# Patient Record
Sex: FEMALE | HISPANIC OR LATINO | URBAN - METROPOLITAN AREA
[De-identification: names, ages, dates, MRNs, and addresses within clinical notes are randomized per-mention and may not be internally consistent; named-entity substitution may affect disease eponyms.]

---

## 2020-07-28 ENCOUNTER — OFFICE VISIT (OUTPATIENT)
Dept: URBAN - METROPOLITAN AREA TELEHEALTH 2 | Facility: TELEHEALTH | Age: 55
End: 2020-07-28

## 2020-12-04 ENCOUNTER — OFFICE VISIT (OUTPATIENT)
Dept: URBAN - METROPOLITAN AREA CLINIC 63 | Facility: CLINIC | Age: 55
End: 2020-12-04

## 2021-02-12 ENCOUNTER — OFFICE VISIT (OUTPATIENT)
Dept: URBAN - METROPOLITAN AREA CLINIC 63 | Facility: CLINIC | Age: 56
End: 2021-02-12

## 2021-02-22 ENCOUNTER — OFFICE VISIT (OUTPATIENT)
Dept: URBAN - METROPOLITAN AREA CLINIC 60 | Facility: CLINIC | Age: 56
End: 2021-02-22

## 2021-02-26 LAB — FECAL GLOBIN BY IMMUNOCHEMISTRY: (no result)

## 2021-03-09 ENCOUNTER — OFFICE VISIT (OUTPATIENT)
Dept: URBAN - METROPOLITAN AREA CLINIC 60 | Facility: CLINIC | Age: 56
End: 2021-03-09

## 2021-12-29 ENCOUNTER — OFFICE VISIT (OUTPATIENT)
Dept: URBAN - METROPOLITAN AREA CLINIC 60 | Facility: CLINIC | Age: 56
End: 2021-12-29

## 2022-07-09 ENCOUNTER — TELEPHONE ENCOUNTER (OUTPATIENT)
Dept: URBAN - METROPOLITAN AREA CLINIC 121 | Facility: CLINIC | Age: 57
End: 2022-07-09

## 2022-07-09 RX ORDER — TOPIRAMATE 25 MG/1
TABLET ORAL
Refills: 0 | OUTPATIENT
Start: 2016-11-05 | End: 2018-03-27

## 2022-07-09 RX ORDER — COLD-HOT PACK
EACH MISCELLANEOUS
Refills: 0 | OUTPATIENT
Start: 2019-05-21 | End: 2021-12-29

## 2022-07-09 RX ORDER — SUCRALFATE 1 G/1
TWICE A DAY TABLET ORAL TWICE A DAY
Refills: 3 | OUTPATIENT
Start: 2018-01-08 | End: 2018-03-27

## 2022-07-09 RX ORDER — CYCLOBENZAPRINE HYDROCHLORIDE 10 MG/1
TABLET, FILM COATED ORAL
Refills: 0 | OUTPATIENT
Start: 2020-12-01 | End: 2021-12-29

## 2022-07-09 RX ORDER — MULTIVIT-MIN/FOLIC/VIT K/LYCOP 400-300MCG
TABLET ORAL
Refills: 0 | OUTPATIENT
Start: 2020-12-04 | End: 2021-12-29

## 2022-07-09 RX ORDER — BACLOFEN 10 MG/1
TABLET ORAL
Refills: 0 | OUTPATIENT
Start: 2017-08-22 | End: 2018-03-27

## 2022-07-09 RX ORDER — COLD-HOT PACK
EACH MISCELLANEOUS
Refills: 0 | OUTPATIENT
Start: 2018-05-07 | End: 2018-12-18

## 2022-07-09 RX ORDER — ACETAMINOPHEN/DIPHENHYDRAMINE 500MG-25MG
TABLET ORAL
Refills: 0 | OUTPATIENT
Start: 2018-03-27 | End: 2018-05-07

## 2022-07-09 RX ORDER — ACETAMINOPHEN/DIPHENHYDRAMINE 500MG-25MG
TABLET ORAL
Refills: 0 | OUTPATIENT
Start: 2019-05-21 | End: 2021-12-29

## 2022-07-09 RX ORDER — PANTOPRAZOLE 20 MG/1
TWICE A DAY TABLET, DELAYED RELEASE ORAL TWICE A DAY
Refills: 3 | OUTPATIENT
Start: 2018-01-08 | End: 2018-03-27

## 2022-07-09 RX ORDER — AMOXICILLIN 500 MG/1
TABLET, FILM COATED ORAL TWICE A DAY
Refills: 0 | OUTPATIENT
Start: 2018-12-18 | End: 2019-03-26

## 2022-07-09 RX ORDER — ACETAMINOPHEN/DIPHENHYDRAMINE 500MG-25MG
TABLET ORAL
Refills: 0 | OUTPATIENT
Start: 2018-05-07 | End: 2018-12-18

## 2022-07-09 RX ORDER — ACETAMINOPHEN/DIPHENHYDRAMINE 500MG-25MG
TABLET ORAL
Refills: 0 | OUTPATIENT
Start: 2018-12-18 | End: 2019-05-21

## 2022-07-09 RX ORDER — COLD-HOT PACK
EACH MISCELLANEOUS
Refills: 0 | OUTPATIENT
Start: 2018-12-18 | End: 2019-05-21

## 2022-07-09 RX ORDER — IBUPROFEN 800 MG/1
TABLET, FILM COATED ORAL
Refills: 0 | OUTPATIENT
Start: 2020-12-01 | End: 2020-12-07

## 2022-07-09 RX ORDER — COLD-HOT PACK
EACH MISCELLANEOUS
Refills: 0 | OUTPATIENT
Start: 2018-03-27 | End: 2018-05-07

## 2022-07-10 ENCOUNTER — TELEPHONE ENCOUNTER (OUTPATIENT)
Dept: URBAN - METROPOLITAN AREA CLINIC 121 | Facility: CLINIC | Age: 57
End: 2022-07-10

## 2022-07-10 RX ORDER — METOPROLOL TARTRATE 25 MG/1
TABLET, FILM COATED ORAL
Refills: 0 | Status: ACTIVE | COMMUNITY
Start: 2021-12-29

## 2022-07-10 RX ORDER — MULTIVIT-MIN/FOLIC/VIT K/LYCOP 400-300MCG
TABLET ORAL ONCE A DAY
Refills: 0 | Status: ACTIVE | COMMUNITY
Start: 2021-12-29

## 2022-07-10 RX ORDER — CYCLOBENZAPRINE HYDROCHLORIDE 10 MG/1
TABLET, FILM COATED ORAL ONCE A DAY
Refills: 0 | Status: ACTIVE | COMMUNITY
Start: 2021-12-29

## 2022-07-10 RX ORDER — ACETAMINOPHEN/DIPHENHYDRAMINE 500MG-25MG
TABLET ORAL ONCE A DAY
Refills: 0 | Status: ACTIVE | COMMUNITY
Start: 2021-12-29

## 2022-07-10 RX ORDER — PANTOPRAZOLE SODIUM 40 MG/1
TABLET, DELAYED RELEASE ORAL ONCE A DAY
Refills: 0 | Status: ACTIVE | COMMUNITY
Start: 2021-12-29

## 2023-01-06 ENCOUNTER — OFFICE VISIT (OUTPATIENT)
Dept: URBAN - METROPOLITAN AREA CLINIC 60 | Facility: CLINIC | Age: 58
End: 2023-01-06
Payer: COMMERCIAL

## 2023-01-06 ENCOUNTER — WEB ENCOUNTER (OUTPATIENT)
Dept: URBAN - METROPOLITAN AREA CLINIC 60 | Facility: CLINIC | Age: 58
End: 2023-01-06

## 2023-01-06 VITALS
BODY MASS INDEX: 33.31 KG/M2 | DIASTOLIC BLOOD PRESSURE: 80 MMHG | WEIGHT: 188 LBS | HEIGHT: 63 IN | RESPIRATION RATE: 20 BRPM | SYSTOLIC BLOOD PRESSURE: 128 MMHG | OXYGEN SATURATION: 94 % | TEMPERATURE: 97.9 F | HEART RATE: 88 BPM

## 2023-01-06 DIAGNOSIS — D50.9 IRON DEFICIENCY ANEMIA, UNSPECIFIED IRON DEFICIENCY ANEMIA TYPE: ICD-10-CM

## 2023-01-06 PROBLEM — 87522002: Status: ACTIVE | Noted: 2023-01-06

## 2023-01-06 PROCEDURE — 99213 OFFICE O/P EST LOW 20 MIN: CPT | Performed by: NURSE PRACTITIONER

## 2023-01-06 RX ORDER — ACETAMINOPHEN/DIPHENHYDRAMINE 500MG-25MG
TABLET ORAL ONCE A DAY
Refills: 0 | Status: ACTIVE | COMMUNITY
Start: 2021-12-29

## 2023-01-06 RX ORDER — CYCLOBENZAPRINE HYDROCHLORIDE 10 MG/1
TABLET, FILM COATED ORAL ONCE A DAY
Refills: 0 | Status: ACTIVE | COMMUNITY
Start: 2021-12-29

## 2023-01-06 RX ORDER — MULTIVIT-MIN/FOLIC/VIT K/LYCOP 400-300MCG
TABLET ORAL ONCE A DAY
Refills: 0 | Status: ACTIVE | COMMUNITY
Start: 2021-12-29

## 2023-01-06 RX ORDER — LISINOPRIL 10 MG/1
1 TABLET TABLET ORAL ONCE A DAY
Status: ACTIVE | COMMUNITY

## 2023-01-06 NOTE — HPI-TODAY'S VISIT:
Patient comes for screening colonoscopy denies alarm symptoms denies family h/o colon cancer will plan colonoscopy.  11/17: Patient was seen in 2015 did not have the colonoscopy done then, now comes for screening. Patient had episode of anemia had EGD on October 2016 and had transfusion of blood, with no evidence of GI bleeding. Did not have a colonoscopy. Comes for evaluation will do colonoscopy. Will consider EGD. Will need records from the hospital. Patient denies melena or rectal bleeding. Patient comes for screening colonoscopy denies alarm symptoms denies family h/o colon cancer will plan colonoscopy. Discussion [Use for free text]. Discussed the need for appropriate follow-up care.  Patient will be placed in our recall system and a letter will be mailed to the patient. Discussed dietary restrictions pertaining to Gastritis Information sheet reviewed and a copy provided.   1/18 Patient with good general state,  gastritis symptoms are better. Colonoscopy with evidence of one tubular adenoma, diverticular disease, internal hemorrhoids and a small 10 mm leiomyoma. EGD with evidence of chronic esophageal and gastric mucosa inflammation and 4 cm Hiatal hernia. 3/18: Persist with anemia was sent for evaluation, will do occult blood in stool. If positive  will repeat EGD and will check occult blood in stool to consider capsule endoscopy / SBFT. 5/18: Patient had labs done with evidence of normal CBC, Ferritin, Iron panel, Vit B12 and folate is normal,  Will follow labs with  PCP (Dr Mcintyre) Will follow as needed basis.  12/18: Patient was seen by GYN had hysterectomy, and had drop of the Hb to 7, that improved and resolved the anemia to 13 with iron IV. it is unclear why patient has recurrent episode of anemia, will plan SBFT and capsule endoscopy to do a complete evaluation of her GI tract as possible cause, patient denies' melena, hematochezia or  hematemesis. Will follow closely, last Hb is 13, will check H and h and will treat as needed, will consider continuing with iron IV by hematology.  3/19: Patient had Capsule endoscopy with normal small bowel no evidence of active bleeding. Will do stool studies to r/o occult blood in stool, may consider colonoscopy if this is positive.  5/19: Patient had stool studies that are normal (occult blood in stool was not done) Patient doing well, denies rectal bleeding. Will follow in 3 months. 7/20: Patient with chronic anemia, in November 2019 her Hb drop to 4. Patient denies rectal bleeding, melena or hematemesis, with personal h/o colonic polyps, diverticulosis, hiatal hernia and gastritis, negative capsule endoscopy. Patient is due for endoscopic evaluation will plan EGD and colonoscopy. Last colonoscopy had a 12 mm polyp. Patient had last month kidney stone, the stone passed. Patient had EGD and colonoscopy at the hospital will need records to decide the surveillance time for her next endoscopic evaluation, for now will hold procedures until las procedures are reviewed and will plan follow-up in 5 months. She will call back if any GI symptoms or evidence of GI bleeding.   12/20 This is a patient that has a medical history of chronic anemia.  Patient had an extensive work-up for anemia in the pass and all her studies has been negative.  Patient last colonoscopy was done a year ago by Dr. Cabrera and was negative. Also, has done colonoscopy and enteroscopy last year  done by Dr Cabrera which were negative, small bowel follow-through,  capsule endoscopy last year, and all her study has been negative for any source of bleeding of the GI. Patient is here today asking to have an EGD because of her hematologist is asking her to have this procedure due to her anemia.  Patient denies any upper GI symptoms, including reflux or gastritis symptoms.  Denies any hematemesis, melena, hematochezia, or rectal bleeding. Plan: We are going to obtain medical records from her hematologist.  Will do follow blood occult on her stool, and we will proceed with EGD accordingly of the study result and  Request.  12/21 Patient here today in good general state.  Denies any GI symptoms.  Denies melena, rectal bleeding, hematochezia, hematemesis.  Occult blood in stool laboratory is negative. Plan: There is no plan for EGD or colonoscopy at this time since patient have no symptoms or sign of GI bleeding at this time. 1/23 Patient here today in good general state.  She was referred by her hematologist due to anemia.  We has been seeing her for anemia in the last 4 years (please see notes above) she has done an extensive work, no GI source of bleeding has been found.  Her last studies were. Patient last colonoscopy was done by Dr. Pepe 3 years ago the procedure shows evidence of severe pandiverticulosis with a small and large diverticula.  At the procedure time there were some impacted diverticula and peridiverticular erythema.  No active bleeding or blood seen.  Moderate sized internal hemorrhoids and external hemorrhoids. Enteroscopy done on November 2019 also by  shows evidence of a 7 cm hiatal hernia without Laurent erosions, normal esophagus, stomach, duodenum, and appearing proximal jejunum, no bleeding was found.  Today patient denies any symptoms of gastritis or reflux dyspepsia, upper GI bleeding or lower GI bleeding like hematemesis, melena, rectal bleeding.  We are going to do stool studies to rule out occult blood in stool.  We may consider colonoscopy, EGD, small bowel follow-through and capsule endoscopy if the studies is positive.

## 2023-02-07 ENCOUNTER — OFFICE VISIT (OUTPATIENT)
Dept: URBAN - METROPOLITAN AREA CLINIC 60 | Facility: CLINIC | Age: 58
End: 2023-02-07

## 2023-02-14 ENCOUNTER — OFFICE VISIT (OUTPATIENT)
Dept: URBAN - METROPOLITAN AREA CLINIC 60 | Facility: CLINIC | Age: 58
End: 2023-02-14

## 2023-02-15 ENCOUNTER — DASHBOARD ENCOUNTERS (OUTPATIENT)
Age: 58
End: 2023-02-15

## 2023-02-21 ENCOUNTER — OFFICE VISIT (OUTPATIENT)
Dept: URBAN - METROPOLITAN AREA CLINIC 60 | Facility: CLINIC | Age: 58
End: 2023-02-21

## 2023-02-21 RX ORDER — ACETAMINOPHEN/DIPHENHYDRAMINE 500MG-25MG
TABLET ORAL ONCE A DAY
Refills: 0 | Status: ACTIVE | COMMUNITY
Start: 2021-12-29

## 2023-02-21 RX ORDER — MULTIVIT-MIN/FOLIC/VIT K/LYCOP 400-300MCG
TABLET ORAL ONCE A DAY
Refills: 0 | Status: ACTIVE | COMMUNITY
Start: 2021-12-29

## 2023-02-21 RX ORDER — LISINOPRIL 10 MG/1
1 TABLET TABLET ORAL ONCE A DAY
Status: ACTIVE | COMMUNITY

## 2023-02-21 RX ORDER — CYCLOBENZAPRINE HYDROCHLORIDE 10 MG/1
TABLET, FILM COATED ORAL ONCE A DAY
Refills: 0 | Status: ACTIVE | COMMUNITY
Start: 2021-12-29

## 2024-11-06 ENCOUNTER — LAB OUTSIDE AN ENCOUNTER (OUTPATIENT)
Dept: URBAN - METROPOLITAN AREA CLINIC 63 | Facility: CLINIC | Age: 59
End: 2024-11-06

## 2024-11-06 ENCOUNTER — OFFICE VISIT (OUTPATIENT)
Dept: URBAN - METROPOLITAN AREA CLINIC 63 | Facility: CLINIC | Age: 59
End: 2024-11-06
Payer: COMMERCIAL

## 2024-11-06 VITALS
WEIGHT: 164 LBS | TEMPERATURE: 98.7 F | HEIGHT: 63 IN | HEART RATE: 82 BPM | DIASTOLIC BLOOD PRESSURE: 85 MMHG | BODY MASS INDEX: 29.06 KG/M2 | SYSTOLIC BLOOD PRESSURE: 151 MMHG | OXYGEN SATURATION: 97 %

## 2024-11-06 DIAGNOSIS — D50.9 IRON DEFICIENCY ANEMIA, UNSPECIFIED: ICD-10-CM

## 2024-11-06 DIAGNOSIS — D12.6 BENIGN NEOPLASM OF COLON, UNSPECIFIED: ICD-10-CM

## 2024-11-06 PROCEDURE — 99214 OFFICE O/P EST MOD 30 MIN: CPT | Performed by: INTERNAL MEDICINE

## 2024-11-06 RX ORDER — MULTIVIT-MIN/FOLIC/VIT K/LYCOP 400-300MCG
TABLET ORAL ONCE A DAY
Refills: 0 | Status: ACTIVE | COMMUNITY
Start: 2021-12-29

## 2024-11-06 RX ORDER — LISINOPRIL 20 MG/1
1 TABLET TABLET ORAL ONCE A DAY
Status: ACTIVE | COMMUNITY

## 2024-11-06 RX ORDER — ACETAMINOPHEN/DIPHENHYDRAMINE 500MG-25MG
TABLET ORAL ONCE A DAY
Refills: 0 | Status: ACTIVE | COMMUNITY
Start: 2021-12-29

## 2024-11-06 NOTE — HPI-TODAY'S VISIT:
Patient comes for screening colonoscopy denies alarm symptoms denies family h/o colon cancer will plan colonoscopy.  11/17: Patient was seen in 2015 did not have the colonoscopy done then, now comes for screening. Patient had episode of anemia had EGD on October 2016 and had transfusion of blood, with no evidence of GI bleeding. Did not have a colonoscopy. Comes for evaluation will do colonoscopy. Will consider EGD. Will need records from the hospital. Patient denies melena or rectal bleeding. Patient comes for screening colonoscopy denies alarm symptoms denies family h/o colon cancer will plan colonoscopy. Discussion [Use for free text]. Discussed the need for appropriate follow-up care.  Patient will be placed in our recall system and a letter will be mailed to the patient. Discussed dietary restrictions pertaining to Gastritis Information sheet reviewed and a copy provided.   1/18 Patient with good general state,  gastritis symptoms are better. Colonoscopy with evidence of one tubular adenoma, diverticular disease, internal hemorrhoids and a small 10 mm leiomyoma. EGD with evidence of chronic esophageal and gastric mucosa inflammation and 4 cm Hiatal hernia. 3/18: Persist with anemia was sent for evaluation, will do occult blood in stool. If positive  will repeat EGD and will check occult blood in stool to consider capsule endoscopy / SBFT. 5/18: Patient had labs done with evidence of normal CBC, Ferritin, Iron panel, Vit B12 and folate is normal,  Will follow labs with  PCP (Dr Mcintyre) Will follow as needed basis.  12/18: Patient was seen by GYN had hysterectomy, and had drop of the Hb to 7, that improved and resolved the anemia to 13 with iron IV. it is unclear why patient has recurrent episode of anemia, will plan SBFT and capsule endoscopy to do a complete evaluation of her GI tract as possible cause, patient denies' melena, hematochezia or  hematemesis. Will follow closely, last Hb is 13, will check H and h and will treat as needed, will consider continuing with iron IV by hematology.  3/19: Patient had Capsule endoscopy with normal small bowel no evidence of active bleeding. Will do stool studies to r/o occult blood in stool, may consider colonoscopy if this is positive.  5/19: Patient had stool studies that are normal (occult blood in stool was not done) Patient doing well, denies rectal bleeding. Will follow in 3 months. 7/20: Patient with chronic anemia, in November 2019 her Hb drop to 4. Patient denies rectal bleeding, melena or hematemesis, with personal h/o colonic polyps, diverticulosis, hiatal hernia and gastritis, negative capsule endoscopy. Patient is due for endoscopic evaluation will plan EGD and colonoscopy. Last colonoscopy had a 12 mm polyp. Patient had last month kidney stone, the stone passed. Patient had EGD and colonoscopy at the hospital will need records to decide the surveillance time for her next endoscopic evaluation, for now will hold procedures until las procedures are reviewed and will plan follow-up in 5 months. She will call back if any GI symptoms or evidence of GI bleeding.   12/20 This is a patient that has a medical history of chronic anemia.  Patient had an extensive work-up for anemia in the pass and all her studies has been negative.  Patient last colonoscopy was done a year ago by Dr. Cabrera and was negative. Also, has done colonoscopy and enteroscopy last year  done by Dr Cabrera which were negative, small bowel follow-through,  capsule endoscopy last year, and all her study has been negative for any source of bleeding of the GI. Patient is here today asking to have an EGD because of her hematologist is asking her to have this procedure due to her anemia.  Patient denies any upper GI symptoms, including reflux or gastritis symptoms.  Denies any hematemesis, melena, hematochezia, or rectal bleeding. Plan: We are going to obtain medical records from her hematologist.  Will do follow blood occult on her stool, and we will proceed with EGD accordingly of the study result and  Request.  12/21 Patient here today in good general state.  Denies any GI symptoms.  Denies melena, rectal bleeding, hematochezia, hematemesis.  Occult blood in stool laboratory is negative. Plan: There is no plan for EGD or colonoscopy at this time since patient have no symptoms or sign of GI bleeding at this time. 1/23 Patient here today in good general state.  She was referred by her hematologist due to anemia.  We has been seeing her for anemia in the last 4 years (please see notes above) she has done an extensive work, no GI source of bleeding has been found.  Her last studies were. Patient last colonoscopy was done by Dr. Pepe 3 years ago the procedure shows evidence of severe pandiverticulosis with a small and large diverticula.  At the procedure time there were some impacted diverticula and peridiverticular erythema.  No active bleeding or blood seen.  Moderate sized internal hemorrhoids and external hemorrhoids. Enteroscopy done on November 2019 also by  shows evidence of a 7 cm hiatal hernia without Laurent erosions, normal esophagus, stomach, duodenum, and appearing proximal jejunum, no bleeding was found.  Today patient denies any symptoms of gastritis or reflux dyspepsia, upper GI bleeding or lower GI bleeding like hematemesis, melena, rectal bleeding.  We are going to do stool studies to rule out occult blood in stool.  We may consider colonoscopy, EGD, small bowel follow-through and capsule endoscopy if the studies is positive. 11/24: Patient had stool studies withno detected occult blood in stool. Patient with recent labs with low Hb that went down to 9 from 13. Will need to do w/u and need to be seen by hematologist.  Will need to get records and will need to do EGD and colonoscopy. Patient with symptoms of UTI, but the w/u was negative. Will need to check labs, iron panel and folate, and B12, will plan endoscopic evaluation and increase of Hb if possible above 9.

## 2024-11-13 ENCOUNTER — TELEPHONE ENCOUNTER (OUTPATIENT)
Dept: URBAN - METROPOLITAN AREA SURGERY CENTER 4 | Facility: SURGERY CENTER | Age: 59
End: 2024-11-13

## 2024-12-04 ENCOUNTER — TELEPHONE ENCOUNTER (OUTPATIENT)
Dept: URBAN - METROPOLITAN AREA CLINIC 63 | Facility: CLINIC | Age: 59
End: 2024-12-04

## 2025-01-06 ENCOUNTER — TELEPHONE ENCOUNTER (OUTPATIENT)
Dept: URBAN - METROPOLITAN AREA SURGERY CENTER 4 | Facility: SURGERY CENTER | Age: 60
End: 2025-01-06

## 2025-01-31 ENCOUNTER — TELEPHONE ENCOUNTER (OUTPATIENT)
Dept: URBAN - METROPOLITAN AREA CLINIC 63 | Facility: CLINIC | Age: 60
End: 2025-01-31

## 2025-02-06 ENCOUNTER — OFFICE VISIT (OUTPATIENT)
Dept: URBAN - METROPOLITAN AREA SURGERY CENTER 4 | Facility: SURGERY CENTER | Age: 60
End: 2025-02-06
Payer: COMMERCIAL

## 2025-02-06 DIAGNOSIS — K22.10 ULCER OF ESOPHAGUS WITHOUT BLEEDING: ICD-10-CM

## 2025-02-06 DIAGNOSIS — K29.60 OTHER GASTRITIS WITHOUT BLEEDING: ICD-10-CM

## 2025-02-06 DIAGNOSIS — K29.70 ERYTHEMATOUS GASTROPATHY: ICD-10-CM

## 2025-02-06 DIAGNOSIS — K57.30 DIVERTICULOSIS OF LARGE INTESTINE WITHOUT PERFORATION OR ABSCESS WITHOUT BLEEDING: ICD-10-CM

## 2025-02-06 DIAGNOSIS — Z86.0100 PERSONAL HISTORY OF COLON POLYPS, UNSPECIFIED: ICD-10-CM

## 2025-02-06 DIAGNOSIS — K64.1 SECOND DEGREE HEMORRHOIDS: ICD-10-CM

## 2025-02-06 DIAGNOSIS — K63.5 BENIGN COLON POLYP: ICD-10-CM

## 2025-02-06 DIAGNOSIS — K44.9 DIAPHRAGMATIC HERNIA WITHOUT OBSTRUCTION OR GANGRENE: ICD-10-CM

## 2025-02-06 DIAGNOSIS — K63.5 COLON POLYP: ICD-10-CM

## 2025-02-06 DIAGNOSIS — Z12.11 ENCOUNTER FOR SCREENING FOR MALIGNANT NEOPLASM OF COLON: ICD-10-CM

## 2025-02-06 DIAGNOSIS — K44.9 HIATAL HERNIA: ICD-10-CM

## 2025-02-06 PROCEDURE — 0529F INTRVL 3/>YR PTS CLNSCP DOCD: CPT | Performed by: INTERNAL MEDICINE

## 2025-02-06 PROCEDURE — 00813 ANES UPR LWR GI NDSC PX: CPT | Performed by: NURSE ANESTHETIST, CERTIFIED REGISTERED

## 2025-02-06 PROCEDURE — 45380 COLONOSCOPY AND BIOPSY: CPT | Performed by: INTERNAL MEDICINE

## 2025-02-06 PROCEDURE — 43239 EGD BIOPSY SINGLE/MULTIPLE: CPT | Performed by: INTERNAL MEDICINE

## 2025-02-06 RX ORDER — ACETAMINOPHEN/DIPHENHYDRAMINE 500MG-25MG
TABLET ORAL ONCE A DAY
Refills: 0 | Status: ACTIVE | COMMUNITY
Start: 2021-12-29

## 2025-02-06 RX ORDER — LISINOPRIL 20 MG/1
1 TABLET TABLET ORAL ONCE A DAY
Status: ACTIVE | COMMUNITY

## 2025-02-06 RX ORDER — MULTIVIT-MIN/FOLIC/VIT K/LYCOP 400-300MCG
TABLET ORAL ONCE A DAY
Refills: 0 | Status: ACTIVE | COMMUNITY
Start: 2021-12-29

## 2025-03-24 ENCOUNTER — OFFICE VISIT (OUTPATIENT)
Dept: URBAN - METROPOLITAN AREA CLINIC 63 | Facility: CLINIC | Age: 60
End: 2025-03-24
Payer: COMMERCIAL

## 2025-03-24 ENCOUNTER — LAB OUTSIDE AN ENCOUNTER (OUTPATIENT)
Dept: URBAN - METROPOLITAN AREA CLINIC 63 | Facility: CLINIC | Age: 60
End: 2025-03-24

## 2025-03-24 DIAGNOSIS — D12.6 BENIGN NEOPLASM OF COLON, UNSPECIFIED: ICD-10-CM

## 2025-03-24 DIAGNOSIS — D50.9 IRON DEFICIENCY ANEMIA, UNSPECIFIED IRON DEFICIENCY ANEMIA TYPE: ICD-10-CM

## 2025-03-24 DIAGNOSIS — K44.9 LARGE HIATAL HERNIA: ICD-10-CM

## 2025-03-24 DIAGNOSIS — K29.60 REFLUX GASTRITIS: ICD-10-CM

## 2025-03-24 PROCEDURE — 99214 OFFICE O/P EST MOD 30 MIN: CPT | Performed by: INTERNAL MEDICINE

## 2025-03-24 RX ORDER — CELECOXIB 200 MG/1
TAKE 1 CAPSULE BY MOUTH DAILY WITH FOOD CAPSULE ORAL
Qty: 30 EACH | Refills: 1 | Status: ACTIVE | COMMUNITY

## 2025-03-24 RX ORDER — ACETAMINOPHEN/DIPHENHYDRAMINE 500MG-25MG
TABLET ORAL ONCE A DAY
Refills: 0 | Status: ACTIVE | COMMUNITY
Start: 2021-12-29

## 2025-03-24 RX ORDER — LISINOPRIL 20 MG/1
1 TABLET TABLET ORAL ONCE A DAY
Status: ACTIVE | COMMUNITY

## 2025-03-24 RX ORDER — MULTIVIT-MIN/FOLIC/VIT K/LYCOP 400-300MCG
TABLET ORAL ONCE A DAY
Refills: 0 | Status: ACTIVE | COMMUNITY
Start: 2021-12-29

## 2025-03-24 RX ORDER — FAMOTIDINE 40 MG/1
TABLET, FILM COATED ORAL
Qty: 30 TABLET | Refills: 1 | Status: ACTIVE | COMMUNITY

## 2025-03-24 NOTE — HPI-TODAY'S VISIT:
Patient comes for screening colonoscopy denies alarm symptoms denies family h/o colon cancer will plan colonoscopy.  11/17: Patient was seen in 2015 did not have the colonoscopy done then, now comes for screening. Patient had episode of anemia had EGD on October 2016 and had transfusion of blood, with no evidence of GI bleeding. Did not have a colonoscopy. Comes for evaluation will do colonoscopy. Will consider EGD. Will need records from the hospital. Patient denies melena or rectal bleeding. Patient comes for screening colonoscopy denies alarm symptoms denies family h/o colon cancer will plan colonoscopy. Discussion [Use for free text]. Discussed the need for appropriate follow-up care.  Patient will be placed in our recall system and a letter will be mailed to the patient. Discussed dietary restrictions pertaining to Gastritis Information sheet reviewed and a copy provided.   1/18 Patient with good general state,  gastritis symptoms are better. Colonoscopy with evidence of one tubular adenoma, diverticular disease, internal hemorrhoids and a small 10 mm leiomyoma. EGD with evidence of chronic esophageal and gastric mucosa inflammation and 4 cm Hiatal hernia. 3/18: Persist with anemia was sent for evaluation, will do occult blood in stool. If positive  will repeat EGD and will check occult blood in stool to consider capsule endoscopy / SBFT. 5/18: Patient had labs done with evidence of normal CBC, Ferritin, Iron panel, Vit B12 and folate is normal,  Will follow labs with  PCP (Dr Mcintyre) Will follow as needed basis.  12/18: Patient was seen by GYN had hysterectomy, and had drop of the Hb to 7, that improved and resolved the anemia to 13 with iron IV. it is unclear why patient has recurrent episode of anemia, will plan SBFT and capsule endoscopy to do a complete evaluation of her GI tract as possible cause, patient denies' melena, hematochezia or  hematemesis. Will follow closely, last Hb is 13, will check H and h and will treat as needed, will consider continuing with iron IV by hematology.  3/19: Patient had Capsule endoscopy with normal small bowel no evidence of active bleeding. Will do stool studies to r/o occult blood in stool, may consider colonoscopy if this is positive.  5/19: Patient had stool studies that are normal (occult blood in stool was not done) Patient doing well, denies rectal bleeding. Will follow in 3 months. 7/20: Patient with chronic anemia, in November 2019 her Hb drop to 4. Patient denies rectal bleeding, melena or hematemesis, with personal h/o colonic polyps, diverticulosis, hiatal hernia and gastritis, negative capsule endoscopy. Patient is due for endoscopic evaluation will plan EGD and colonoscopy. Last colonoscopy had a 12 mm polyp. Patient had last month kidney stone, the stone passed. Patient had EGD and colonoscopy at the hospital will need records to decide the surveillance time for her next endoscopic evaluation, for now will hold procedures until las procedures are reviewed and will plan follow-up in 5 months. She will call back if any GI symptoms or evidence of GI bleeding.   12/20 This is a patient that has a medical history of chronic anemia.  Patient had an extensive work-up for anemia in the pass and all her studies has been negative.  Patient last colonoscopy was done a year ago by Dr. Cabrera and was negative. Also, has done colonoscopy and enteroscopy last year  done by Dr Cabrera which were negative, small bowel follow-through,  capsule endoscopy last year, and all her study has been negative for any source of bleeding of the GI. Patient is here today asking to have an EGD because of her hematologist is asking her to have this procedure due to her anemia.  Patient denies any upper GI symptoms, including reflux or gastritis symptoms.  Denies any hematemesis, melena, hematochezia, or rectal bleeding. Plan: We are going to obtain medical records from her hematologist.  Will do follow blood occult on her stool, and we will proceed with EGD accordingly of the study result and  Request.  12/21 Patient here today in good general state.  Denies any GI symptoms.  Denies melena, rectal bleeding, hematochezia, hematemesis.  Occult blood in stool laboratory is negative. Plan: There is no plan for EGD or colonoscopy at this time since patient have no symptoms or sign of GI bleeding at this time. 1/23 Patient here today in good general state.  She was referred by her hematologist due to anemia.  We has been seeing her for anemia in the last 4 years (please see notes above) she has done an extensive work, no GI source of bleeding has been found.  Her last studies were. Patient last colonoscopy was done by Dr. Pepe 3 years ago the procedure shows evidence of severe pandiverticulosis with a small and large diverticula.  At the procedure time there were some impacted diverticula and peridiverticular erythema.  No active bleeding or blood seen.  Moderate sized internal hemorrhoids and external hemorrhoids. Enteroscopy done on November 2019 also by  shows evidence of a 7 cm hiatal hernia without Laurent erosions, normal esophagus, stomach, duodenum, and appearing proximal jejunum, no bleeding was found.  Today patient denies any symptoms of gastritis or reflux dyspepsia, upper GI bleeding or lower GI bleeding like hematemesis, melena, rectal bleeding.  We are going to do stool studies to rule out occult blood in stool.  We may consider colonoscopy, EGD, small bowel follow-through and capsule endoscopy if the studies is positive. 11/24: Patient had stool studies withno detected occult blood in stool. Patient with recent labs with low Hb that went down to 9 from 13. Will need to do w/u and need to be seen by hematologist.  Will need to get records and will need to do EGD and colonoscopy. Patient with symptoms of UTI, but the w/u was negative. Will need to check labs, iron panel and folate, and B12, will plan endoscopic evaluation and increase of Hb if possible above 9. 3/25: Patient had upper endoscopy and colonoscopy.  On February 2025.  Upper endoscopy showed normal esophagus with a 10 cm hiatal hernia, erosions in the large hiatal hernia no active bleeding biopsies were taken, nonerosive gastritis characterized by erythema biopsy taken and normal duodenum biopsy taken for workup of.  Her anemia.  Colonoscopy showed polyps around 5 mm in the cecum, descending, and sigmoid colon that were removed with cold biopsy forceps, diverticulosis in the entire colon and internal hemorrhoids.  Pathology report showed normal duodenum, gastric mucosa with reactive gastropathy negative for H. pylori, EG junction with mucosa with mild chronic inflammation negative for intestinal metaplasia.  Polyp showed to have a tubular adenomas at the cecum and hyperplastic polyp in the descending and sigmoid colon.  Was recommended repeat colonoscopy in 3 years.  Will continue treatment for acid reflux patient with a large hiatal hernia if repair of the hiatal hernia is pursue will need to do manometry of the esophagus and referral to surgery. Will do manometry and will do referral to surgeon.